# Patient Record
Sex: MALE | Race: WHITE
[De-identification: names, ages, dates, MRNs, and addresses within clinical notes are randomized per-mention and may not be internally consistent; named-entity substitution may affect disease eponyms.]

---

## 2020-09-11 ENCOUNTER — HOSPITAL ENCOUNTER (EMERGENCY)
Dept: HOSPITAL 62 - ER | Age: 20
Discharge: HOME | End: 2020-09-11
Payer: SELF-PAY

## 2020-09-11 VITALS — DIASTOLIC BLOOD PRESSURE: 77 MMHG | SYSTOLIC BLOOD PRESSURE: 122 MMHG

## 2020-09-11 DIAGNOSIS — Z20.828: ICD-10-CM

## 2020-09-11 DIAGNOSIS — R00.0: ICD-10-CM

## 2020-09-11 DIAGNOSIS — R09.81: ICD-10-CM

## 2020-09-11 DIAGNOSIS — M79.18: ICD-10-CM

## 2020-09-11 DIAGNOSIS — R07.9: Primary | ICD-10-CM

## 2020-09-11 DIAGNOSIS — F17.290: ICD-10-CM

## 2020-09-11 DIAGNOSIS — F12.10: ICD-10-CM

## 2020-09-11 DIAGNOSIS — R50.9: ICD-10-CM

## 2020-09-11 DIAGNOSIS — R25.2: ICD-10-CM

## 2020-09-11 LAB
A TYPE INFLUENZA AG: NEGATIVE
ADD MANUAL DIFF: NO
ALBUMIN SERPL-MCNC: 4.9 G/DL (ref 3.5–5)
ALP SERPL-CCNC: 102 U/L (ref 38–126)
ANION GAP SERPL CALC-SCNC: 10 MMOL/L (ref 5–19)
AST SERPL-CCNC: 27 U/L (ref 17–59)
B INFLUENZA AG: NEGATIVE
BASE EXCESS BLDV CALC-SCNC: 0.2 MMOL/L
BASOPHILS # BLD AUTO: 0 10^3/UL (ref 0–0.2)
BASOPHILS NFR BLD AUTO: 0.1 % (ref 0–2)
BILIRUB DIRECT SERPL-MCNC: 0.3 MG/DL (ref 0–0.4)
BILIRUB SERPL-MCNC: 1 MG/DL (ref 0.2–1.3)
BUN SERPL-MCNC: 14 MG/DL (ref 7–20)
CALCIUM: 9.9 MG/DL (ref 8.4–10.2)
CHLORIDE SERPL-SCNC: 101 MMOL/L (ref 98–107)
CO2 SERPL-SCNC: 28 MMOL/L (ref 22–30)
EOSINOPHIL # BLD AUTO: 0 10^3/UL (ref 0–0.6)
EOSINOPHIL NFR BLD AUTO: 0.1 % (ref 0–6)
ERYTHROCYTE [DISTWIDTH] IN BLOOD BY AUTOMATED COUNT: 12 % (ref 11.5–14)
GLUCOSE SERPL-MCNC: 101 MG/DL (ref 75–110)
HCO3 BLDV-SCNC: 25.1 MMOL/L (ref 20–32)
HCT VFR BLD CALC: 46.4 % (ref 37.9–51)
HGB BLD-MCNC: 15.8 G/DL (ref 13.5–17)
INR PPP: 1.17
LYMPHOCYTES # BLD AUTO: 1.2 10^3/UL (ref 0.5–4.7)
LYMPHOCYTES NFR BLD AUTO: 9.2 % (ref 13–45)
MCH RBC QN AUTO: 29 PG (ref 27–33.4)
MCHC RBC AUTO-ENTMCNC: 34 G/DL (ref 32–36)
MCV RBC AUTO: 85 FL (ref 80–97)
MONOCYTES # BLD AUTO: 1.2 10^3/UL (ref 0.1–1.4)
MONOCYTES NFR BLD AUTO: 9.6 % (ref 3–13)
NEUTROPHILS # BLD AUTO: 10.2 10^3/UL (ref 1.7–8.2)
NEUTS SEG NFR BLD AUTO: 81 % (ref 42–78)
PCO2 BLDV: 41.5 MMHG (ref 35–63)
PH BLDV: 7.4 [PH] (ref 7.3–7.42)
PLATELET # BLD: 212 10^3/UL (ref 150–450)
POTASSIUM SERPL-SCNC: 4.5 MMOL/L (ref 3.6–5)
PROT SERPL-MCNC: 7.6 G/DL (ref 6.3–8.2)
PROTHROMBIN TIME: 15.1 SEC (ref 11.4–15.4)
RBC # BLD AUTO: 5.44 10^6/UL (ref 4.35–5.55)
TOTAL CELLS COUNTED % (AUTO): 100 %
WBC # BLD AUTO: 12.5 10^3/UL (ref 4–10.5)

## 2020-09-11 PROCEDURE — 87040 BLOOD CULTURE FOR BACTERIA: CPT

## 2020-09-11 PROCEDURE — 93010 ELECTROCARDIOGRAM REPORT: CPT

## 2020-09-11 PROCEDURE — 82803 BLOOD GASES ANY COMBINATION: CPT

## 2020-09-11 PROCEDURE — C9803 HOPD COVID-19 SPEC COLLECT: HCPCS

## 2020-09-11 PROCEDURE — 93005 ELECTROCARDIOGRAM TRACING: CPT

## 2020-09-11 PROCEDURE — 36415 COLL VENOUS BLD VENIPUNCTURE: CPT

## 2020-09-11 PROCEDURE — 85025 COMPLETE CBC W/AUTO DIFF WBC: CPT

## 2020-09-11 PROCEDURE — 96360 HYDRATION IV INFUSION INIT: CPT

## 2020-09-11 PROCEDURE — 83605 ASSAY OF LACTIC ACID: CPT

## 2020-09-11 PROCEDURE — 80053 COMPREHEN METABOLIC PANEL: CPT

## 2020-09-11 PROCEDURE — 99285 EMERGENCY DEPT VISIT HI MDM: CPT

## 2020-09-11 PROCEDURE — 71045 X-RAY EXAM CHEST 1 VIEW: CPT

## 2020-09-11 PROCEDURE — 87635 SARS-COV-2 COVID-19 AMP PRB: CPT

## 2020-09-11 PROCEDURE — 87804 INFLUENZA ASSAY W/OPTIC: CPT

## 2020-09-11 PROCEDURE — 85610 PROTHROMBIN TIME: CPT

## 2020-09-11 NOTE — RADIOLOGY REPORT (SQ)
CLINICAL INDICATION: Chest pain fever 101.5.



TECHNIQUE: A single portable AP  view was obtained of the chest

at 2122 hours.



COMPARISON: None.



FINDINGS: The cardiomediastinal  silhouette is normal. The lungs

are grossly clear. No evidence of effusion or pneumothorax. The

visualized bones are unremarkable.



IMPRESSION: No evidence of active intrathoracic disease.

## 2020-09-11 NOTE — ER DOCUMENT REPORT
ED General





- General


Chief Complaint: Chest Pain


Stated Complaint: CHEST PAIN,FEVER,MUSCLE PAIN


Time Seen by Provider: 20 19:30


Mode of Arrival: Ambulatory





- [facility]


Context: 





This is a 20-year-old male with no prior significant medical history presenting 

to the emergency department complaining of 2 days of chest pain and myalgias.  

Patient states at times he feels tightness chest and feels better when he 

stretches and "pops" is sternum.  Patient localizes the chest pain to the 

midsternal region.  Patient denies cough.  Patient states that symptoms are 

worse if he sneezes.  Patient states he had some nasal congestion past couple of

days but denies loss of sense of smell or sense of taste.  Patient denies known 

contact with patients under investigation for COVID or COVID positive persons.  

Patient states he vapes but has not done so in the past couple of days just 

because of his chest pain.  Patient states he is also having leg cramps for the 

past 2 days that feel "like a restless leg syndrome".





- Related Data


Allergies/Adverse Reactions: 


                                        





No Known Allergies Allergy (Unverified 20 19:36)


   











Past Medical History





- General


Information source: Patient





- Social History


Smoking Status: Former Smoker - Patient states he has been vaping but has not 

done so in the past 2 days.


Chew tobacco use (# tins/day): No


Frequency of alcohol use: Occasional


Drug Abuse: Marijuana


Family History: Reviewed & Not Pertinent


Patient has homicidal ideation: No





- Medical History


Medical History: Other - ADHD, migraines





Review of Systems





- Review of Systems


Constitutional: No symptoms reported


EENT: No symptoms reported


Cardiovascular: Chest pain


Respiratory: No symptoms reported


Gastrointestinal: No symptoms reported


Genitourinary: No symptoms reported


Male Genitourinary: No symptoms reported


Musculoskeletal: Other - Leg myalgias


Skin: No symptoms reported


Hematologic/Lymphatic: No symptoms reported


Neurological/Psychological: No symptoms reported


-: Yes All other systems reviewed and negative





Physical Exam





- Vital signs


Vitals: 


                                        











Temp Pulse Resp BP Pulse Ox


 


 101.5 F H  110 H  18   122/75   99 


 


 20 19:28  20 19:28  20 19:28  20 19:28  20 19:28














- Notes


Notes: 





CONSTITUTIONAL 


[Vital signs reviewed, Patient appears comfortable, Alert and oriented X 3, 

Normal stature.]


HEAD 


[Atraumatic, Normocephalic.]


EYES 


[Eyes are normal to inspection, No discharge from eyes, Extraocular muscles in

tact, Sclera are normal, Conjunctiva are normal.]]


NECK 


[Normal ROM, No jugular venous distention, No meningeal signs, no carotid 

bruit.]


RESPIRATORY CHEST 


[Chest is nontender, Breath sounds normal, No respiratory distress.]


CARDIOVASCULAR 


[RRR, No murmurs, Normal S1 S2, No rub, No gallop.]


ABDOMEN 


[Abdomen is nontender, No pulsatile masses, No other masses, Bowel sounds 

normal, No distension, No peritoneal signs, No hernias.]


BACK 


[There is no CVA Tenderness, There is no tenderness to palpation, Normal 

inspection.]


UPPER EXTREMITY 


[Inspection normal, No cyanosis, No clubbing, No edema, 2+ radial pulses.]


LOWER EXTREMITY 


[Inspection normal, No cyanosis, No clubbing, No edema, No calf tenderness, 2+ 

femoral pulses.]


NEURO 


[No focal motor deficits, No focal sensory deficits, Speech normal.]


SKIN 


[Skin is warm, Skin is dry, Skin is normal color.]


LYMPHATIC 


[No adenopathy in neck.]


PSYCHIATRIC 


[Normal affect. ]





Course





- Re-evaluation


Re-evalutation: 





20 21:22


Differential diagnosis: URI, pneumonia, COVID, influenza, viral syndrome.


20 22:44


Results of ED MSE discussed with patient.  There appears to be no evidence of 

bacterial pneumonia or influenza.  Patient is resting in bed and is in no acute 

distress.  All questions were answered prior to discharge.  Precautions for 

COVID P URI discussed with patient.  Patient informed that he would be notified 

by the health department of his results in the coming week.  Emergency signs and

symptoms, reasons to return to the emergency department discussed with patient.





Medical decision making: Patient currently has a nontoxic appearance and has no 

evidence of a emergency medical condition at this point.  Patient will be 

discharged home with precautions and instructions on using Motrin and Tylenol 

for myalgias and fever as well as pain.  Patient may have a viral syndrome or 

perhaps COVID but at this time patient's exact etiology of fever and symptoms is

unknown.  Given the patient's appearance and lack of comorbidities, this MD 

feels that symptomatic care is reasonable.





- Vital Signs


Vital signs: 


                                        











Temp Pulse Resp BP Pulse Ox


 


 98.9 F   110 H  17   128/79 H  98 


 


 20 21:43  20 19:28  20 22:00  20 21:01  20 22:00














- Laboratory


Result Diagrams: 


                                 20 20:52





                                 20 20:52


Laboratory results interpreted by me: 


                                        











  20





  20:52


 


WBC  12.5 H


 


Lymph % (Auto)  9.2 L


 


Absolute Neuts (auto)  10.2 H


 


Seg Neutrophils %  81.0 H














- Diagnostic Test


Radiology reviewed: Reports reviewed





- EKG Interpretation by Me


Additional EKG results interpreted by me: 





20 21:24


EKG obtained on 2020 at 1923 hrs. was interpreted by this MD.  Findings 

sinus tachycardia, heart rate 101, normal axis, OR interval within normal 

limits, P waves preceding QRS complexes, QRS complexes appear narrow, there are 

no obvious patterns of ST segment elevation or depression present to suggest 

acute myocardial ischemia or infarction.  Impression: Sinus tachycardia with 

nonspecific ST segments.  There is no prior EKG available for comparison.





Discharge





- Discharge


Clinical Impression: 


 Myalgia, Person under investigation for COVID-19





Chest pain


Qualifiers:


 Chest pain type: unspecified Qualified Code(s): R07.9 - Chest pain, unspecified





Fever


Qualifiers:


 Fever type: unspecified Qualified Code(s): R50.9 - Fever, unspecified





Condition: Stable


Disposition: HOME, SELF-CARE


Instructions:  Acetaminophen, Use of Over-The-Counter Ibuprofen ([facility]), COVID-19 

Guidance for Persons Under Investigation


Additional Instructions: 


Return to the Emergency Department without delay if any worse.





Follow the instructions given to you about precautions for possible COVID 

infection.  The health department will contact you in the coming week with the 

results of your test.











HOME CARE INSTRUCTIONS & INFORMATION:  Thank you for choosing us for your 

medical needs. We hope you're satisfied with the care you received.  After you 

leave, you must properly care for your problem and, at the same time, observe 

its progress.  Any condition can change.  Some illnesses can change rapidly over

hours or days.  If your condition worsens, return to the Emergency Department or

see your physician promptly.





ABOUT YOUR X-RAYS AND EKG'S:   If you had an EKG or X-rays taken, they have been

read by the Emergency Physician. The X-rays and EKG's will also be read by a 

Radiologist or Cardiologist within 24 hours.  If discrepancies are noted, you 

will be notified by telephone.  Please be certain the ED has a correct telephone

number & address where you can be reached.  Also, realize that some fractures or

abnormalities do not show up on initial X-rays.  If your symptoms continue, see 

your physician.





ABOUT YOUR LABORATORY TEST:   If you had laboratory tests, the results have been

reviewed by the Emergency Physician.  Some test results (for example cultures) 

may not be available for several days.  You will be contacted if any test result

shows you need additional treatment.  Please be certain the ED has a correct 

telephone number and address where you can be reached.





ABOUT YOUR MEDICATIONS:  You will receive instructions on how to take your 

medicine on the prescription label you receive.  Additional information may be 

provided by the Pharmacy.  If you have questions afterwards, call the ED for 

clarification or further instructions.  Some prescribed medications may cause 

drowsiness.  Do not perform tasks such as driving a car or operating machinery 

without consulting your Pharmacist.  If you feel you need a refill of pain 

medication, your condition will need re-evaluation.  Please do not call for a re

fill of any medication.





ABOUT YOUR SIGNATURE:   Signature of this document acknowledges to followin. Understanding that you received emergency treatment and that you may be 

   released before al medical problems are known or treated. Please be certain  

   the ED has a correct phone number & address where you can be reached.


   2. Acknowledgement that you will arrange for follow-up care as recommended.


   3. Authorization for the Emergency Physician to provide information to your 

follow-up Physician in order to maximize your care.





AT ANY TIME, IF YOUR SYMPTOMS CHANGE SIGNIFICANTLY OR WORSEN OR YOU DEVELOP NEW 

SYMPTOMS, RETURN TO THE EMERGENCY DEPARTMENT IMMEDIATELY FOR RE-EVALUATION.





OUR GOAL IS TO PROVIDE EXCELLENT MEDICAL CARE!





WE HOPE THAT WE HAVE MET YOUR EXPECTATIONS DURING YOUR EMERGENCY DEPARTMENT 

VISIT AND THAT YOU FEEL YOU HAVE RECEIVED EXCELLENT CARE!








Fever





     Fever is the body's reaction to infection.  Fever can also occur with 

illnesses that create fever-producing substances in the body.  By itself, fever 

is not harmful.  It helps the body fight invading germs. We are more concerned 

with: 


(1) What's causing the fever? 


(2) How can we keep you more comfortable until the fever goes away?


     Early in an illness, symptoms are often so vague that a diagnosis can't be 

made.  If the doctor hasn't identified a clear cause for your fever, you will 

probably develop new symptoms within the next two days. Contact the doctor if 

you develop severe worsening headache, rash, chest pain, cough with yellow or 

green sputum, difficulty breathing, abdominal pain, or other new symptoms.


     There is no reason to treat a fever if you're comfortable.  If the fever is

causing aches, headache, and fatigue, you can treat it with ibuprofen (Advil, 

Nuprin, etc) or acetaminophen (Tylenol). Follow the directions on the bottle.


     Get plenty of liquids (three quarts per day).  Rest.  Physical work or 

sports will raise the temperature higher and make you feel much worse.  Dress 

lightly.


     If you're chilling, this means the temperature is trying to go higher.  

Take ibuprofen or acetaminophen.  When you feel sweaty and "feverish" the 

temperature is coming down.


     If the fever doesn't go away within two days or if you become more ill, 

call the doctor or return at once for re-examination.


Referrals: 


FRANCESCA SOTELO MD [HONORARY] - Follow up as needed

## 2020-09-11 NOTE — ER DOCUMENT REPORT
ED Medical Screen (RME)





- General


Chief Complaint: Chest Pain


Stated Complaint: CHEST PAIN,FEVER,MUSCLE PAIN


Time Seen by Provider: 09/11/20 19:30


Mode of Arrival: Ambulatory


Information source: Patient


Notes: 





20-year-old male presented to ED for complaint of chest pain.  He states the 

last 2 days his chest is felt very tight like he cannot get a breath.  He states

sometimes he will have it for 2 or 3 days and you crack his back and it will 

feel better but today it did not get better.  He does have a temperature of 

101.5 at this time.  He states he also has lower back pain.  He states he felt 

fine this morning then developed a headache but has had the chest pain with 

shortness of breath off and on for 2 days.  He states he also has some body 

aches to his legs.  He states he does use a vapor cigarette he occasionally 

drinks and has not used any marijuana for at least a month.  I have ordered him 

Tylenol and a septic work-up due to the fact that he had a temperature of 110 

and a pulse of 101.5 with chest pain.  He states he does have a history of ADHD 

and as a baby had his left arm fractured with surgery he also had a dilatation 

of the urethra and he was found on the toilet when he was a little he thinks he 

had to have CPR at that time.  Patient is alert oriented respirations regular 

nonlabored speaking in full sentences.  He states he does not have covered even 

though he has body aches temperature and chest pain.  I have spoken to the 

charge nurse about getting him a bed on the [facility].














I have greeted and performed a rapid initial assessment of this patient.  A 

comprehensive ED assessment and evaluation of the patient, analysis of test 

results and completion of medical decision making process will be conducted by 

an additional ED providers.





- Related Data


Allergies/Adverse Reactions: 


                                        





No Known Allergies Allergy (Unverified 09/11/20 19:36)


   











Past Medical History





- Social History


Chew tobacco use (# tins/day): No


Frequency of alcohol use: Occasional


Drug Abuse: Marijuana





Physical Exam





- Vital signs


Vitals: 





                                        











Temp Pulse Resp BP Pulse Ox


 


 101.5 F H  110 H  18   122/75   99 


 


 09/11/20 19:28  09/11/20 19:28  09/11/20 19:28  09/11/20 19:28  09/11/20 19:28














Course





- Vital Signs


Vital signs: 





                                        











Temp Pulse Resp BP Pulse Ox


 


 101.5 F H  110 H  18   122/75   99 


 


 09/11/20 19:28  09/11/20 19:28  09/11/20 19:28  09/11/20 19:28  09/11/20 19:28

## 2020-09-14 NOTE — EKG REPORT
SEVERITY:- OTHERWISE NORMAL ECG -

SINUS TACHYCARDIA

:

Confirmed by: Tobias Quintero MD 14-Sep-2020 02:18:56